# Patient Record
Sex: FEMALE | Race: WHITE | Employment: FULL TIME
[De-identification: names, ages, dates, MRNs, and addresses within clinical notes are randomized per-mention and may not be internally consistent; named-entity substitution may affect disease eponyms.]

---

## 2023-05-19 RX ORDER — BUPROPION HYDROCHLORIDE 300 MG/1
300 TABLET ORAL EVERY MORNING
COMMUNITY

## 2023-05-19 RX ORDER — IBUPROFEN 600 MG/1
TABLET ORAL EVERY 6 HOURS PRN
COMMUNITY

## 2023-05-19 RX ORDER — FOLIC ACID 1 MG/1
1 TABLET ORAL DAILY
COMMUNITY

## 2023-06-19 ENCOUNTER — HOSPITAL ENCOUNTER (OUTPATIENT)
Facility: HOSPITAL | Age: 43
Setting detail: RECURRING SERIES
Discharge: HOME | End: 2023-06-22
Payer: COMMERCIAL

## 2023-06-19 PROCEDURE — 97112 NEUROMUSCULAR REEDUCATION: CPT | Performed by: PHYSICAL THERAPIST

## 2023-06-19 PROCEDURE — 97162 PT EVAL MOD COMPLEX 30 MIN: CPT | Performed by: PHYSICAL THERAPIST

## 2023-06-19 NOTE — PROGRESS NOTES
PHYSICAL / OCCUPATIONAL THERAPY - DAILY TREATMENT NOTE (updated )  For Eval visit    Patient Name: Isidro Espana    Date: 2023    : 1980  Insurance: Payor: Derek Ramirez / Plan: Deonte Wells / Product Type: *No Product type* /      Patient  verified yes     Visit #   Current / Total 1 8-12   Time   In / Out 426 524   Total Treatment  Time  58   Pain   In / Out 1/10 \"dull\" achy\" 0/10   Subjective Functional Status/Changes: See below     TREATMENT AREA =  Other low back pain [M54.59]    SUBJECTIVE: Pt is a 43 y.o female with complaint of Left sided low back pain for a year, described as dull and achy. Pt points to is starting from her L PSIS down to just below her L buttock. Pt reports she has a x-ray with the findings of scoliosis and mild facet arthropathy L5-S1. She states that she has had scoliosis since childhood and her mother told her that she has a roughly 32 deg curve convex to the R side, but that there is a smaller curve superior to that one that is convex to the L. Aggravating factors are sleeping on L side, standing, reaching behind on her left side, sitting for prolonged periods of time, washing dishes with worst pain at 4-5/10. Relieving factors are laying flat on her back, shifting positions, walking, leaning to the R when sitting with best pain being 1/10. Her average pain over the last couple of days has been a 3/10. She has an appt with her PCP in 2023. The patient has a history of psoriatic arthritis and loss of 30lbs in the last year. 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 Limitations: getting up from the floor after playing with niece, unable to run currently due to the pain afterwards, ADLs without pain, sleeping on her L side    Goal: to not be in pain; to start running again      OBJECTIVE    Modalities Rationale:     decrease inflammation and decrease pain to improve patient's ability to progress to PLOF and address remaining functional goals.      min []

## 2023-06-19 NOTE — PROGRESS NOTES
[address] PHYSICAL THERAPY  [address], [address] [place] [address] Phone: [phone] Fax [phone]  Plan of Care / Statement of Necessity for Physical Therapy Services     Patient Name: Kaiden Frias : 1980   Medical   Diagnosis: *Other low back pain [M54.59] Treatment Diagnosis: M54.59  OTHER LOWER BACK PAIN     Onset Date: 2022 Payor: Payor: Kiana Bai / Plan: Marla Lloyd / Product Type: *No Product type* /    Referral Source: Qing Boast Start of Care [facility]): 2023   Prior Hospitalization: See medical history Provider #: [id number]   Prior Level of Function: Independent, ADLs, running   Comorbidities: Psoriatic Arthritis, Scoliosis, Hx of Sciatica, mild facet arthropathy L5-S1,      Assessment / key information:   Pt is a 43 y.o female with complaint of Left sided low back pain for a year, described as dull and achy. Pt points to is starting from her L PSIS down to just below her L buttock. Pt reports she has a x-ray with the findings of scoliosis and mild facet arthropathy L5-S1. She states that she has had scoliosis since childhood and her mother told her that she has a roughly 32 deg curve convex to the R side, but that there is a smaller curve superior to that one that is convex to the L. Aggravating factors are sleeping on L side, standing, reaching behind on her left side, sitting for prolonged periods of time, washing dishes with worst pain at 4-5/10. Relieving factors are laying flat on her back, shifting positions, walking, leaning to the R when sitting with best pain being 1/10. Her average pain over the last couple of days has been a 3/10. She has an appt with her PCP in 2023. The patient has a history of psoriatic arthritis and loss of 30lbs in the last year. 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       Limitations: getting up from the floor after playing with niece, unable to run currently due to the pain

## 2023-07-12 ENCOUNTER — HOSPITAL ENCOUNTER (OUTPATIENT)
Facility: HOSPITAL | Age: 43
Setting detail: RECURRING SERIES
Discharge: HOME | End: 2023-07-15
Payer: COMMERCIAL

## 2023-07-12 PROCEDURE — 97140 MANUAL THERAPY 1/> REGIONS: CPT

## 2023-07-12 PROCEDURE — 97530 THERAPEUTIC ACTIVITIES: CPT

## 2023-07-12 NOTE — PROGRESS NOTES
PHYSICAL / OCCUPATIONAL THERAPY - DAILY TREATMENT NOTE (updated )    Patient Name: Anahi Poole    Date: 2023    : 1980  Insurance: Payor: Petrona Soni / Plan: Tim Jaime / Product Type: *No Product type* /      Patient  verified yes     Visit #   Current / Total 2 8-12   Time   In / Out 420 519    Total Treatment Time 59   Pain   In / Out 4-5/10 2/10   Subjective Functional Status/Changes: There are times where I fell okay, and times where it is a dull, annoying, constant, miserable pain. TREATMENT AREA =  Other low back pain [M54.59]    OBJECTIVE    Modalities Rationale:     decrease pain and increase tissue extensibility to improve patient's ability to progress to PLOF and address remaining functional goals. min [] Estim Unattended, type/location:                                      []  w/ice    []  w/heat    min [] Estim Attended, type/location:                                     []  w/US     []  w/ice    []  w/heat    []  TENS insruct      min []  Mechanical Traction: type/lbs                   []  pro   []  sup   []  int   []  cont    []  before manual    []  after manual    min []  Ultrasound, settings/location:      min []  Iontophoresis w/ dexamethasone, location:                                               []  take home patch       []  in clinic   10 min  unbilled []  Ice     [x]  Heat    location/position: Right sidelying to Left QL/piriformis     min []  Paraffin,  details:     min []  Vasopneumatic Device, press/temp:     min []  Tonie Teran / Gee Inman:     If using vaso (only need to measure limb vaso being performed on)      pre-treatment girth :       post-treatment girth :       measured at (landmark location) :      min []  Other:    Skin assessment post-treatment (if applicable):    [x]  intact    [x]  redness- no adverse reaction                 []redness - adverse reaction:      Vasopnuematic compression justification:  Per bilateral girth measures taken and listed

## 2023-07-18 ENCOUNTER — HOSPITAL ENCOUNTER (OUTPATIENT)
Facility: HOSPITAL | Age: 43
Setting detail: RECURRING SERIES
Discharge: HOME | End: 2023-07-21
Payer: COMMERCIAL

## 2023-07-18 PROCEDURE — 97535 SELF CARE MNGMENT TRAINING: CPT

## 2023-07-18 PROCEDURE — 97530 THERAPEUTIC ACTIVITIES: CPT

## 2023-07-18 NOTE — PROGRESS NOTES
[address] [facility] PHYSICAL THERAPY  [address] [place] [address] [place] [address], [address] [place] [address]  Phone: [phone]   [phone]  PHYSICAL THERAPY PROGRESS NOTE  Patient Name: Maria Alejandra Gomez : 1980   Treatment/Medical Diagnosis: Other low back pain [M54.59]   Referral Source: Eric Sloan,*     Date of Initial Visit: 2023 Attended Visits: 3 Missed Visits: 0     SUMMARY OF TREATMENT  The pt has been seen for 3 visits to address low back pain. Therapeutic interventions have included therapeutic exercise, therapeutic activity, neuromuscular re-education, manual treatment, modalities and patient education.      CURRENT STATUS  % improvement: minimal improvement   Max pain 7/10 - attempting to sleep   Avg pain 4/10  Min pain 2-3/10      Current improvements: minimal improvements - patient only had 1 F/U appt, some improvement in symptoms with piriformis stretching and manual treatments to piriformis      Remaining functional limitations: intermittent radicular sx to LLE to hamstrings with c/o occasional N/T at left foot, pain with sleeping, pain with sitting for work, pain with standing to wash dishes, pain with long car rides, pain with getting up from the floor     FOTO: 77/100 (+7 points)    Objective Information/Functional Measures/Assessment:  The patient presents for reassessment following 3 visits to address low back pain   Patient reports good compliance with HEP, updated program   Instructed patient on self massage for piriformis with tennis ball  Improvement in functional mobility indicated by FOTO score change of +7 points to 77/100   ASLR test positive on the left   Trunk rotation AROM: right 100% AROM, left 75% AROM   Left hip strength: flexion 4-/5, ABD 4-/5, extension 4-/5, ER 4/5, glute max 4-/5    Left knee strength: 4-/5 extension    Core/lumbar strength: Plank: 10 seconds with good form, bridge: 25% AROM with c/o mild discomfort  She continues to
press/temp:     min []  Rachel Blue / Honey Main[place]: If using vaso (only need to measure limb vaso being performed on)      pre-treatment girth :       post-treatment girth :       measured at (landmark location) :      min []  Other:    Skin assessment post-treatment (if applicable):    [x]  intact    [x]  redness- no adverse reaction                 []redness - adverse reaction:      Vasopnuematic compression justification:  Per bilateral girth measures taken and listed above the edema is considered significant and having an impact on the patient's strength, balance, gait, and transfers     Therapeutic Procedures: Tx Min Billable or 1:1 Min (if diff from Tx Min) Procedure, Rationale, Specifics     [address]84 Therapeutic Exercise (timed):  increase ROM, strength, coordination, balance, and proprioception to improve patient's ability to progress to PLOF and address remaining functional goals. (see flow sheet as applicable)     Details if applicable:       46  46  [id number] Therapeutic Activity (timed):  use of dynamic activities replicating functional movements to increase ROM, strength, coordination, balance, and proprioception in order to improve patient's ability to progress to PLOF and address remaining functional goals. (see flow sheet as applicable)     Details if applicable:   REASSESSMENT, FOTO, HEP DEVELOPMENT AND REVIEW    TC:  Bike  Wall V with lift  Open books  Clams x 2  bridges - TC   TC  TC  [id number] Neuromuscular Re-Education (timed):  improve balance, coordination, kinesthetic sense, posture, core stability and proprioception to improve patient's ability to develop conscious control of individual muscles and awareness of position of extremities in order to progress to PLOF and address remaining functional goals.  (see flow sheet as applicable)     Details if applicable:    Hip x 3 for SLS  PPT  PPT with band/ball    held  held  [address] Manual Therapy (timed):  decrease pain, increase ROM, increase tissue

## 2023-07-31 ENCOUNTER — HOSPITAL ENCOUNTER (OUTPATIENT)
Facility: HOSPITAL | Age: 43
Setting detail: RECURRING SERIES
End: 2023-07-31
Payer: COMMERCIAL

## 2023-08-02 ENCOUNTER — HOSPITAL ENCOUNTER (OUTPATIENT)
Facility: HOSPITAL | Age: 43
Setting detail: RECURRING SERIES
Discharge: HOME | End: 2023-08-05
Payer: COMMERCIAL

## 2023-08-02 PROCEDURE — 97140 MANUAL THERAPY 1/> REGIONS: CPT

## 2023-08-02 PROCEDURE — 97530 THERAPEUTIC ACTIVITIES: CPT

## 2023-08-02 PROCEDURE — 97112 NEUROMUSCULAR REEDUCATION: CPT

## 2023-08-02 NOTE — PROGRESS NOTES
PHYSICAL / OCCUPATIONAL THERAPY - DAILY TREATMENT NOTE (updated )    Patient Name: Santosh Mcpherson    Date: 2023    : 1980  Insurance: Payor: Kee Munoz / Plan: Martha Wilson / Product Type: *No Product type* /      Patient  verified yes     Visit #   Current / Total 1 12   Time   In / Out 4:45 5:34   Total Treatment Time 49   Pain   In / Out 2/10 1/10   Subjective Functional Status/Changes: I saw an orthopedic just in case. She thought I might have something with my sacroiliac joint and inflammation. She gave me a prednisone pack and Lyrica. I just need to double check with my rheumatologist.      TREATMENT AREA =  Other low back pain [M54.59]    OBJECTIVE    Modalities Rationale:     decrease pain and increase tissue extensibility to improve patient's ability to progress to PLOF and address remaining functional goals. min [] Estim Unattended, type/location:                                      []  w/ice    []  w/heat    min [] Estim Attended, type/location:                                     []  w/US     []  w/ice    []  w/heat    []  TENS insruct      min []  Mechanical Traction: type/lbs                   []  pro   []  sup   []  int   []  cont    []  before manual    []  after manual    min []  Ultrasound, settings/location:      min []  Iontophoresis w/ dexamethasone, location:                                               []  take home patch       []  in clinic   10 min  unbilled []  Ice     [x]  Heat    location/position: Right sidelying to Left hip/back     min []  Paraffin,  details:     min []  Vasopneumatic Device, press/temp:     min []  Reese Pouch / Meche Gobble:     If using vaso (only need to measure limb vaso being performed on)      pre-treatment girth :       post-treatment girth :       measured at (landmark location) :      min []  Other:    Skin assessment post-treatment (if applicable):    [x]  intact    [x]  redness- no adverse reaction                 []redness - adverse reaction:

## 2023-08-07 ENCOUNTER — APPOINTMENT (OUTPATIENT)
Facility: HOSPITAL | Age: 43
End: 2023-08-07
Payer: COMMERCIAL

## 2023-08-08 ENCOUNTER — HOSPITAL ENCOUNTER (OUTPATIENT)
Facility: HOSPITAL | Age: 43
Setting detail: RECURRING SERIES
Discharge: HOME | End: 2023-08-11
Payer: COMMERCIAL

## 2023-08-08 PROCEDURE — 97112 NEUROMUSCULAR REEDUCATION: CPT

## 2023-08-08 PROCEDURE — 97140 MANUAL THERAPY 1/> REGIONS: CPT

## 2023-08-08 NOTE — PROGRESS NOTES
[facility] Totals Reminder: bill using total billable min of TIMED therapeutic procedures (example: do not include dry needle or estim unattended, both untimed codes, in totals to left)  8-22 min = 1 unit; 23-37 min = 2 units; 38-52 min = 3 units; 53-67 min = 4 units; 68-82 min = 5 units   Total Total     [x]  Patient Education billed concurrently with other procedures   [x] Review HEP    [] Progressed/Changed HEP, detail:    [] Other detail:       Objective Information/Functional Measures/Assessment:  checked SI  Presented with (R) posterior and (L) anterior and (R) up-slip  Corrected with with MET and leg pull  Piriformis release to the (L) side in prone   Gave handout for self correction with use of PVC tube   GOALS  The patient will be IND and compliant with HEP to prepare for D/C. Status at last assessment (07/18/2023):  compliant, updated program    Current:  patient reports compliance with HEP especially use of tennis ball for trigger point release 8/8/2023    Patient will continue to benefit from skilled PT / OT services to modify and progress therapeutic interventions, analyze and address functional mobility deficits, analyze and address ROM deficits, analyze and address strength deficits, analyze and address soft tissue restrictions, analyze and cue for proper movement patterns, analyze and modify for postural abnormalities, analyze and address imbalance/dizziness, and instruct in home and community integration to address functional deficits and attain remaining goals. Progress toward goals / Updated goals:  []  See Progress Note/Recertification    LONG-TERM GOALS TO BE ACHIEVED IN 12 treatments: The patient will be IND and compliant with HEP to prepare for D/C.    Status at last assessment (07/18/2023):  compliant, updated program    Current:  patient reports compliance with HEP especially use of tennis ball for trigger point release 8/8/2023  The patient will improve left hip strength globally to 5/5

## 2023-08-09 ENCOUNTER — HOSPITAL ENCOUNTER (OUTPATIENT)
Facility: HOSPITAL | Age: 43
Setting detail: RECURRING SERIES
Discharge: HOME | End: 2023-08-12
Payer: COMMERCIAL

## 2023-08-09 PROCEDURE — 97530 THERAPEUTIC ACTIVITIES: CPT

## 2023-08-09 PROCEDURE — 97140 MANUAL THERAPY 1/> REGIONS: CPT

## 2023-08-09 PROCEDURE — 97112 NEUROMUSCULAR REEDUCATION: CPT

## 2023-08-09 NOTE — PROGRESS NOTES
PHYSICAL / OCCUPATIONAL THERAPY - DAILY TREATMENT NOTE (updated )    Patient Name: Andre Galvez    Date: 2023    : 1980  Insurance: Payor: [address] [place] [address] Drive / Plan: Juanita Tomlle / Product Type: *No Product type* /      Patient  verified yes     Visit #   Current / Total 3 12   Time   In / Out 4:48 5:42   Total Treatment Time 54   Pain   In / Out 3.5/10 0/10   Subjective Functional Status/Changes: It started last night, it was nothing the tennis ball was going to fix. TREATMENT AREA =  Other low back pain [M54.59]    OBJECTIVE    Modalities Rationale:     decrease pain and increase tissue extensibility to improve patient's ability to progress to PLOF and address remaining functional goals. min [] Estim Unattended, type/location:                                      []  w/ice    []  w/heat    min [] Estim Attended, type/location:                                     []  w/US     []  w/ice    []  w/heat    []  TENS insruct      min []  Mechanical Traction: type/lbs                   []  pro   []  sup   []  int   []  cont    []  before manual    []  after manual    min []  Ultrasound, settings/location:      min []  Iontophoresis w/ dexamethasone, location:                                               []  take home patch       []  in clinic   10 min  unbilled []  Ice     [x]  Heat    location/position: Prone to lumbar sacral area    min []  Paraffin,  details:     min []  Vasopneumatic Device, press/temp:     min []  Lannis Forada / Alyd Isatuwa:     If using vaso (only need to measure limb vaso being performed on)      pre-treatment girth :       post-treatment girth :       measured at (landmark location) :      min []  Other:    Skin assessment post-treatment (if applicable):    [x]  intact    [x]  redness- no adverse reaction                 []redness - adverse reaction:      Vasopnuematic compression justification:  Per bilateral girth measures taken and listed above the edema is considered significant and

## 2023-08-21 ENCOUNTER — APPOINTMENT (OUTPATIENT)
Facility: HOSPITAL | Age: 43
End: 2023-08-21
Payer: COMMERCIAL

## 2023-08-23 ENCOUNTER — APPOINTMENT (OUTPATIENT)
Facility: HOSPITAL | Age: 43
End: 2023-08-23
Payer: COMMERCIAL